# Patient Record
Sex: FEMALE | Race: WHITE | NOT HISPANIC OR LATINO | URBAN - METROPOLITAN AREA
[De-identification: names, ages, dates, MRNs, and addresses within clinical notes are randomized per-mention and may not be internally consistent; named-entity substitution may affect disease eponyms.]

---

## 2020-12-14 ENCOUNTER — MEDICAL CORRESPONDENCE (OUTPATIENT)
Dept: HEALTH INFORMATION MANAGEMENT | Facility: CLINIC | Age: 35
End: 2020-12-14

## 2020-12-14 ENCOUNTER — TRANSFERRED RECORDS (OUTPATIENT)
Dept: HEALTH INFORMATION MANAGEMENT | Facility: CLINIC | Age: 35
End: 2020-12-14

## 2020-12-15 ENCOUNTER — TRANSCRIBE ORDERS (OUTPATIENT)
Dept: OTHER | Age: 35
End: 2020-12-15

## 2020-12-15 DIAGNOSIS — M67.431 GANGLION CYST OF DORSUM OF BOTH WRISTS: Primary | ICD-10-CM

## 2020-12-15 DIAGNOSIS — M67.432 GANGLION CYST OF DORSUM OF BOTH WRISTS: Primary | ICD-10-CM

## 2020-12-17 NOTE — TELEPHONE ENCOUNTER
RECORDS RECEIVED FROM: Ganglion cyst of dorsum of both wrists/No imaging?/ Mirella James PA-C @ [facility]   DATE RECEIVED: Jan 18, 2021   NOTES STATUS DETAILS   OFFICE NOTE from referring provider Received    OFFICE NOTE from other specialist N/A    DISCHARGE SUMMARY from hospital N/A    DISCHARGE REPORT from the ER N/A    OPERATIVE REPORT N/A    MEDICATION LIST N/A    IMPLANT RECORD/STICKER N/A    LABS     CBC/DIFF N/A    CULTURES N/A    INJECTIONS DONE IN RADIOLOGY N/A    MRI N/A    CT SCAN N/A    XRAYS (IMAGES & REPORTS) N/A    TUMOR     PATHOLOGY  Slides & report N/A    12/17/20   12:52 PM   Complete  Ashley Souza CMA

## 2021-01-13 DIAGNOSIS — M67.431 GANGLION CYST OF BOTH WRISTS: Primary | ICD-10-CM

## 2021-01-13 DIAGNOSIS — M67.432 GANGLION CYST OF BOTH WRISTS: Primary | ICD-10-CM

## 2021-01-18 ENCOUNTER — OFFICE VISIT (OUTPATIENT)
Dept: ORTHOPEDICS | Facility: CLINIC | Age: 36
End: 2021-01-18
Payer: COMMERCIAL

## 2021-01-18 ENCOUNTER — ANCILLARY PROCEDURE (OUTPATIENT)
Dept: GENERAL RADIOLOGY | Facility: CLINIC | Age: 36
End: 2021-01-18
Attending: STUDENT IN AN ORGANIZED HEALTH CARE EDUCATION/TRAINING PROGRAM
Payer: COMMERCIAL

## 2021-01-18 ENCOUNTER — PRE VISIT (OUTPATIENT)
Dept: ORTHOPEDICS | Facility: CLINIC | Age: 36
End: 2021-01-18

## 2021-01-18 DIAGNOSIS — M67.432 GANGLION CYST OF DORSUM OF BOTH WRISTS: ICD-10-CM

## 2021-01-18 DIAGNOSIS — M67.431 GANGLION CYST OF DORSUM OF BOTH WRISTS: ICD-10-CM

## 2021-01-18 DIAGNOSIS — M67.432 GANGLION CYST OF BOTH WRISTS: ICD-10-CM

## 2021-01-18 DIAGNOSIS — M67.431 GANGLION CYST OF BOTH WRISTS: ICD-10-CM

## 2021-01-18 PROCEDURE — 73110 X-RAY EXAM OF WRIST: CPT | Mod: RT | Performed by: RADIOLOGY

## 2021-01-18 PROCEDURE — 99203 OFFICE O/P NEW LOW 30 MIN: CPT | Performed by: STUDENT IN AN ORGANIZED HEALTH CARE EDUCATION/TRAINING PROGRAM

## 2021-01-18 NOTE — PROGRESS NOTES
Hand Surgery History & Physical    REFERRING PHYSICIAN: Mirella James   PRIMARY CARE PHYSICIAN: No primary care provider on file.           Chief Complaint:   Consult (Ganglion cyst of dorsum of both hands )      History of Present Illness:  Symptom Profile Including: location of symptoms, onset, severity, exacerbating/alleviating factors, previous treatments:        Shayna Bradshaw is a 35 year old female who presents for evaluation of bilateral dorsal wrist masses as well as intermittent numbness and tingling in the bilateral thumb, index, middle fingers.    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  She states that she has had the right dorsal wrist mass for approximately 3 years.  It has not been that bothersome and and did rupture in the past after she fell and then recurred approximately 6 months later.  The left dorsal wrist mass presented as recently during finals and was associated with some pain.  She wears braces intermittently when it seems like they flareup.    Regarding the numbness and tingling in her bilateral thumb, index, middle fingers, she states that she first noticed this approximately 7 years ago after delivering her first child.  It resolved with bracing.  The symptoms occur intermittently and she perceives slight decrease sensation in the median nerve distribution.  She denies nocturnal numbness.  The symptoms do not wake her from sleep.  She has not done consistent bracing.             Past Medical History:   No past medical history on file.         Past Surgical History:   No past surgical history on file.    Oophorectomy for dermoid cyst         Social History:     Social History     Tobacco Use     Smoking status: Not on file   Substance Use Topics     Alcohol use: Not on file            Family History:   No family history on file.         Allergies:     Allergies   Allergen Reactions     Azithromycin GI  Disturbance            Medications:     No current outpatient medications on file.     No current facility-administered medications for this visit.              Review of Systems:     A 10 point ROS was performed and reviewed. Specific responses to these questions are noted at the end of the document.         Physical Exam:   Vitals: There were no vitals taken for this visit.    Physical Exam Adult:  General: Well-nourished, alert, cooperative with exam and in no acute distress  HEENT: Atraumatic, normocephalic, extraocular movements intact, moist mucous membranes, trachea midline  Pulmonary: Unlabored work of breathing, on room air  Cardiovascular: Warm and well-perfused extremities. 2+ radial pulses  Skin: Warm, intact without rashes to the upper extremities, head or neck   Gait: Normal  Neuro/psych: Oriented to time, place and person. Affect is appropriate    Musculoskeletal: A focused physical examination of the bilateral hands and wrists reveals:   Inspection- no evidence of deformity, malalignment ecchymosis or edema.  There is a 2 cm x 2 cm right dorsal wrist mass localized to the scapholunate joint.  On the left there is a 1 cm x 1 cm soft, mobile, dorsal wrist mass that is only visible with wrist hyperflexion.  Palpation-mild tenderness to palpation over the left scapholunate interval.  Nontender to palpation over the right or left dorsal wrist mass.  Neurovascular- intact light touch sensation and motor to distribution of the median, ulnar and radial nerves. Brisk capillary refill to the distal fingers. 2+ radial pulse.   Range of Motion: Full and symmetric. Able to make a full fist.  Stability: no dislocation, subluxation or laxity  Muscle strength and tone: No atrophy, spasticity or flaccidness. 5/5 strength EPL, FPL, APB, first dorsal interosseous.    Special Tests:    Nerve: two point discrimination millimeters in the median and ulnar nerve distributions bilaterally    Median nerve  Thenar atrophy:  None  Carpal tunnel compression test: Negative bilaterally  Phalen's test: Positive bilaterally  Tinel's test: Negative bilaterally    Ulnar nerve  First dorsal interosseous atrophy: Negative    Tendon: FDS and FDP intact to all fingers. Normal tenodesis effect.     Mera's test: Negative bilaterally                      Imaging:   3 view X-ray of the bilateral wrists obtained on 1/18/2021 was independently interpreted by me. The results were discussed with the patient.  Findings include:    Right wrist: Normal osseous anatomy with possible small cyst in the radial aspect of the lunate.  No scapholunate widening visualized.  Lateral reveals soft tissue mass.  No calcification within the mass.  No lytic or blastic lesions associated with the mass.  No effusion visualized.    Left Wrist: Normal osseous anatomy with possible small cyst in the radial aspect of the lunate.  No scapholunate widening visualized.  No soft tissue mass visualized on the lateral no calcification within the mass.  No lytic or blastic lesions associated with the mass.  No effusion visualized.             Assessment and Plan:   Assessment:  35 year old female with bilateral dorsal wrist masses consistent with ganglion cysts as well as signs, symptoms, clinical exam consistent with bilateral carpal tunnel syndrome.     At this time the carpal tunnel symptoms are more intermittent.  She does not have constant numbness and she has normal two-point discrimination and normal strength.     Plan:  1. I counseled the patient on her diagnoses and that the dorsal wrist masses are benign.  I counseled her that she has normal strength and normal sensation in the bilateral median nerve distribution.  2. For the carpal tunnel syndrome I recommend 6 weeks of consistent night splinting.  If her symptoms are refractory to this treatment we could explore options of corticosteroid injection and discuss surgery if this becomes blocked operative.  3. For the  ganglion cyst, I counseled her that it is up to her if she wants to manage them.  If they are bothersome to her either functionally or aesthetically displeasing we can perform surgical excision.  I would not really recommend aspiration due to the high risk of recurrence.  4. The patient would like to think about this option and will call us if she wishes to pursue surgical excision of the ganglion cyst.      Abi Dos Santos MD  Department of Orthopedic Surgery  Hand Surgery

## 2021-01-18 NOTE — NURSING NOTE
Reason For Visit:   Chief Complaint   Patient presents with     Consult     Ganglion cyst of dorsum of both hands        Primary MD: No primary care provider on file.  Ref. MD:  Mirella James     ?  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   Currently working? Yes.  Work status?  Part-time.  Date of injury: Gradual.   Date of Injury: No, surgery, but 3 fractures of R wrist. Hx of carpal tunnel after pregnancy.   No History of surgery.   Smoker: No    There were no vitals taken for this visit.      Pain Assessment  Patient Currently in Pain: Yes  0-10 Pain Scale: 1  Primary Pain Location: Hand  Other Pain Locations: really bad during final week previously      QuickDASH Assessment  No flowsheet data found.     Allergies   Allergen Reactions     Azithromycin GI Disturbance     Jayashree Chapman ATC

## 2021-01-18 NOTE — LETTER
1/18/2021         RE: Shayna Bradshaw  [address] [facility] [address]        Dear Colleague,    Thank you for referring your patient, Shayna Bradshaw, to the [facility]. Please see a copy of my visit note below.    Hand Surgery History & Physical    REFERRING PHYSICIAN: Mirella James   PRIMARY CARE PHYSICIAN: No primary care provider on file.           Chief Complaint:   Consult (Ganglion cyst of dorsum of both hands )      History of Present Illness:  Symptom Profile Including: location of symptoms, onset, severity, exacerbating/alleviating factors, previous treatments:        Shayna Bradshaw is a 35 year old female who presents for evaluation of bilateral dorsal wrist masses as well as intermittent numbness and tingling in the bilateral thumb, index, middle fingers.    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  She states that she has had the right dorsal wrist mass for approximately 3 years.  It has not been that bothersome and and did rupture in the past after she fell and then recurred approximately 6 months later.  The left dorsal wrist mass presented as recently during finals and was associated with some pain.  She wears braces intermittently when it seems like they flareup.    Regarding the numbness and tingling in her bilateral thumb, index, middle fingers, she states that she first noticed this approximately 7 years ago after delivering her first child.  It resolved with bracing.  The symptoms occur intermittently and she perceives slight decrease sensation in the median nerve distribution.  She denies nocturnal numbness.  The symptoms do not wake her from sleep.  She has not done consistent bracing.             Past Medical History:   No past medical history on file.         Past Surgical History:   No past surgical history on file.    Oophorectomy for dermoid cyst          Social History:     Social History     Tobacco Use     Smoking status: Not on file   Substance Use Topics     Alcohol use: Not on file            Family History:   No family history on file.         Allergies:     Allergies   Allergen Reactions     Azithromycin GI Disturbance            Medications:     No current outpatient medications on file.     No current facility-administered medications for this visit.              Review of Systems:     A 10 point ROS was performed and reviewed. Specific responses to these questions are noted at the end of the document.         Physical Exam:   Vitals: There were no vitals taken for this visit.    Physical Exam Adult:  General: Well-nourished, alert, cooperative with exam and in no acute distress  HEENT: Atraumatic, normocephalic, extraocular movements intact, moist mucous membranes, trachea midline  Pulmonary: Unlabored work of breathing, on room air  Cardiovascular: Warm and well-perfused extremities. 2+ radial pulses  Skin: Warm, intact without rashes to the upper extremities, head or neck   Gait: Normal  Neuro/psych: Oriented to time, place and person. Affect is appropriate    Musculoskeletal: A focused physical examination of the bilateral hands and wrists reveals:   Inspection- no evidence of deformity, malalignment ecchymosis or edema.  There is a 2 cm x 2 cm right dorsal wrist mass localized to the scapholunate joint.  On the left there is a 1 cm x 1 cm soft, mobile, dorsal wrist mass that is only visible with wrist hyperflexion.  Palpation-mild tenderness to palpation over the left scapholunate interval.  Nontender to palpation over the right or left dorsal wrist mass.  Neurovascular- intact light touch sensation and motor to distribution of the median, ulnar and radial nerves. Brisk capillary refill to the distal fingers. 2+ radial pulse.   Range of Motion: Full and symmetric. Able to make a full fist.  Stability: no dislocation, subluxation or laxity  Muscle  strength and tone: No atrophy, spasticity or flaccidness. 5/5 strength EPL, FPL, APB, first dorsal interosseous.    Special Tests:    Nerve: two point discrimination millimeters in the median and ulnar nerve distributions bilaterally    Median nerve  Thenar atrophy: None  Carpal tunnel compression test: Negative bilaterally  Phalen's test: Positive bilaterally  Tinel's test: Negative bilaterally    Ulnar nerve  First dorsal interosseous atrophy: Negative    Tendon: FDS and FDP intact to all fingers. Normal tenodesis effect.     Mera's test: Negative bilaterally                      Imaging:   3 view X-ray of the bilateral wrists obtained on 1/18/2021 was independently interpreted by me. The results were discussed with the patient.  Findings include:    Right wrist: Normal osseous anatomy with possible small cyst in the radial aspect of the lunate.  No scapholunate widening visualized.  Lateral reveals soft tissue mass.  No calcification within the mass.  No lytic or blastic lesions associated with the mass.  No effusion visualized.    Left Wrist: Normal osseous anatomy with possible small cyst in the radial aspect of the lunate.  No scapholunate widening visualized.  No soft tissue mass visualized on the lateral no calcification within the mass.  No lytic or blastic lesions associated with the mass.  No effusion visualized.             Assessment and Plan:   Assessment:  35 year old female with bilateral dorsal wrist masses consistent with ganglion cysts as well as signs, symptoms, clinical exam consistent with bilateral carpal tunnel syndrome.     At this time the carpal tunnel symptoms are more intermittent.  She does not have constant numbness and she has normal two-point discrimination and normal strength.     Plan:  1. I counseled the patient on her diagnoses and that the dorsal wrist masses are benign.  I counseled her that she has normal strength and normal sensation in the bilateral median nerve  distribution.  2. For the carpal tunnel syndrome I recommend 6 weeks of consistent night splinting.  If her symptoms are refractory to this treatment we could explore options of corticosteroid injection and discuss surgery if this becomes blocked operative.  3. For the ganglion cyst, I counseled her that it is up to her if she wants to manage them.  If they are bothersome to her either functionally or aesthetically displeasing we can perform surgical excision.  I would not really recommend aspiration due to the high risk of recurrence.  4. The patient would like to think about this option and will call us if she wishes to pursue surgical excision of the ganglion cyst.      Abi Dos Santos MD  Department of Orthopedic Surgery  Hand Surgery

## 2022-01-06 ENCOUNTER — OFFICE VISIT (OUTPATIENT)
Dept: OBGYN | Facility: CLINIC | Age: 37
End: 2022-01-06
Payer: COMMERCIAL

## 2022-01-06 VITALS
DIASTOLIC BLOOD PRESSURE: 78 MMHG | HEIGHT: 63 IN | BODY MASS INDEX: 31.54 KG/M2 | WEIGHT: 178 LBS | SYSTOLIC BLOOD PRESSURE: 110 MMHG

## 2022-01-06 DIAGNOSIS — Z13.0 SCREENING, ANEMIA, DEFICIENCY, IRON: ICD-10-CM

## 2022-01-06 DIAGNOSIS — Z13.220 SCREENING FOR LIPID DISORDERS: ICD-10-CM

## 2022-01-06 DIAGNOSIS — Z12.4 PAP SMEAR FOR CERVICAL CANCER SCREENING: ICD-10-CM

## 2022-01-06 DIAGNOSIS — Z13.29 SCREENING FOR THYROID DISORDER: ICD-10-CM

## 2022-01-06 DIAGNOSIS — Z01.419 ENCOUNTER FOR GYNECOLOGICAL EXAMINATION WITHOUT ABNORMAL FINDING: Primary | ICD-10-CM

## 2022-01-06 DIAGNOSIS — N39.498 OTHER URINARY INCONTINENCE: ICD-10-CM

## 2022-01-06 DIAGNOSIS — Z13.1 SCREENING FOR DIABETES MELLITUS: ICD-10-CM

## 2022-01-06 DIAGNOSIS — Z11.59 NEED FOR HEPATITIS C SCREENING TEST: ICD-10-CM

## 2022-01-06 DIAGNOSIS — Z23 NEED FOR TDAP VACCINATION: ICD-10-CM

## 2022-01-06 LAB
ALBUMIN SERPL-MCNC: 4.1 G/DL (ref 3.4–5)
ALP SERPL-CCNC: 81 U/L (ref 40–150)
ALT SERPL W P-5'-P-CCNC: 42 U/L (ref 0–50)
ANION GAP SERPL CALCULATED.3IONS-SCNC: 8 MMOL/L (ref 3–14)
AST SERPL W P-5'-P-CCNC: 19 U/L (ref 0–45)
BILIRUB SERPL-MCNC: 0.4 MG/DL (ref 0.2–1.3)
BUN SERPL-MCNC: 12 MG/DL (ref 7–30)
CALCIUM SERPL-MCNC: 8.8 MG/DL (ref 8.5–10.1)
CHLORIDE BLD-SCNC: 108 MMOL/L (ref 94–109)
CHOLEST SERPL-MCNC: 186 MG/DL
CO2 SERPL-SCNC: 22 MMOL/L (ref 20–32)
CREAT SERPL-MCNC: 0.72 MG/DL (ref 0.52–1.04)
ERYTHROCYTE [DISTWIDTH] IN BLOOD BY AUTOMATED COUNT: 12.1 % (ref 10–15)
FASTING STATUS PATIENT QL REPORTED: NO
GFR SERPL CREATININE-BSD FRML MDRD: >90 ML/MIN/1.73M2
GLUCOSE BLD-MCNC: 80 MG/DL (ref 70–99)
HBA1C MFR BLD: 5.1 % (ref 0–5.6)
HCT VFR BLD AUTO: 39.5 % (ref 35–47)
HCV AB SERPL QL IA: NONREACTIVE
HDLC SERPL-MCNC: 43 MG/DL
HGB BLD-MCNC: 13.2 G/DL (ref 11.7–15.7)
LDLC SERPL CALC-MCNC: 114 MG/DL
MCH RBC QN AUTO: 29.1 PG (ref 26.5–33)
MCHC RBC AUTO-ENTMCNC: 33.4 G/DL (ref 31.5–36.5)
MCV RBC AUTO: 87 FL (ref 78–100)
NONHDLC SERPL-MCNC: 143 MG/DL
PLATELET # BLD AUTO: 290 10E3/UL (ref 150–450)
POTASSIUM BLD-SCNC: 3.7 MMOL/L (ref 3.4–5.3)
PROT SERPL-MCNC: 7.6 G/DL (ref 6.8–8.8)
RBC # BLD AUTO: 4.54 10E6/UL (ref 3.8–5.2)
SODIUM SERPL-SCNC: 138 MMOL/L (ref 133–144)
TRIGL SERPL-MCNC: 145 MG/DL
TSH SERPL DL<=0.005 MIU/L-ACNC: 1.52 MU/L (ref 0.4–4)
WBC # BLD AUTO: 6.6 10E3/UL (ref 4–11)

## 2022-01-06 PROCEDURE — 90471 IMMUNIZATION ADMIN: CPT | Performed by: OBSTETRICS & GYNECOLOGY

## 2022-01-06 PROCEDURE — 87624 HPV HI-RISK TYP POOLED RSLT: CPT | Performed by: OBSTETRICS & GYNECOLOGY

## 2022-01-06 PROCEDURE — 85027 COMPLETE CBC AUTOMATED: CPT | Performed by: OBSTETRICS & GYNECOLOGY

## 2022-01-06 PROCEDURE — 83036 HEMOGLOBIN GLYCOSYLATED A1C: CPT | Performed by: OBSTETRICS & GYNECOLOGY

## 2022-01-06 PROCEDURE — 80061 LIPID PANEL: CPT | Performed by: OBSTETRICS & GYNECOLOGY

## 2022-01-06 PROCEDURE — G0145 SCR C/V CYTO,THINLAYER,RESCR: HCPCS | Performed by: OBSTETRICS & GYNECOLOGY

## 2022-01-06 PROCEDURE — 86803 HEPATITIS C AB TEST: CPT | Performed by: OBSTETRICS & GYNECOLOGY

## 2022-01-06 PROCEDURE — 36415 COLL VENOUS BLD VENIPUNCTURE: CPT | Performed by: OBSTETRICS & GYNECOLOGY

## 2022-01-06 PROCEDURE — 99385 PREV VISIT NEW AGE 18-39: CPT | Mod: 25 | Performed by: OBSTETRICS & GYNECOLOGY

## 2022-01-06 PROCEDURE — 84443 ASSAY THYROID STIM HORMONE: CPT | Performed by: OBSTETRICS & GYNECOLOGY

## 2022-01-06 PROCEDURE — 80053 COMPREHEN METABOLIC PANEL: CPT | Performed by: OBSTETRICS & GYNECOLOGY

## 2022-01-06 PROCEDURE — 90715 TDAP VACCINE 7 YRS/> IM: CPT | Performed by: OBSTETRICS & GYNECOLOGY

## 2022-01-06 RX ORDER — COPPER 313.4 MG/1
1 INTRAUTERINE DEVICE INTRAUTERINE ONCE
COMMUNITY

## 2022-01-06 ASSESSMENT — MIFFLIN-ST. JEOR: SCORE: 1461.53

## 2022-01-06 NOTE — LETTER
January 7, 2022      Shayna Bradshaw  [address]  [facility] [address]        Dear Ms.McNulty Bradshaw,    We are writing to inform you of your test results.  Your results are normal.    Resulted Orders   CBC with platelets   Result Value Ref Range    WBC Count 6.6 4.0 - 11.0 10e3/uL    RBC Count 4.54 3.80 - 5.20 10e6/uL    Hemoglobin 13.2 11.7 - 15.7 g/dL    Hematocrit 39.5 35.0 - 47.0 %    MCV 87 78 - 100 fL    MCH 29.1 26.5 - 33.0 pg    MCHC 33.4 31.5 - 36.5 g/dL    RDW 12.1 10.0 - 15.0 %    Platelet Count 290 150 - 450 10e3/uL   Comprehensive metabolic panel   Result Value Ref Range    Sodium 138 133 - 144 mmol/L    Potassium 3.7 3.4 - 5.3 mmol/L    Chloride 108 94 - 109 mmol/L    Carbon Dioxide (CO2) 22 20 - 32 mmol/L    Anion Gap 8 3 - 14 mmol/L    Urea Nitrogen 12 7 - 30 mg/dL    Creatinine 0.72 0.52 - 1.04 mg/dL    Calcium 8.8 8.5 - 10.1 mg/dL    Glucose 80 70 - 99 mg/dL    Alkaline Phosphatase 81 40 - 150 U/L    AST 19 0 - 45 U/L    ALT 42 0 - 50 U/L    Protein Total 7.6 6.8 - 8.8 g/dL    Albumin 4.1 3.4 - 5.0 g/dL    Bilirubin Total 0.4 0.2 - 1.3 mg/dL    GFR Estimate >90 >60 mL/min/1.73m2      Comment:      Effective December 21, 2021 eGFRcr in adults is calculated using the 2021 CKD-EPI creatinine equation which includes age and gender (Bhumi banks al., NEJM, DOI: 10.1056/CSYMat5637576)   Hemoglobin A1c   Result Value Ref Range    Hemoglobin A1C 5.1 0.0 - 5.6 %      Comment:      Normal <5.7%   Prediabetes 5.7-6.4%    Diabetes 6.5% or higher     Note: Adopted from ADA consensus guidelines.   TSH with free T4 reflex   Result Value Ref Range    TSH 1.52 0.40 - 4.00 mU/L   Lipid panel reflex to direct LDL Non-fasting   Result Value Ref Range    Cholesterol 186 <200 mg/dL    Triglycerides 145 <150 mg/dL    Direct Measure HDL 43 (L) >=50 mg/dL    LDL Cholesterol Calculated 114 (H) <=100 mg/dL    Non HDL Cholesterol 143 (H) <130 mg/dL    Patient Fasting > 8hrs? No     Narrative     Cholesterol  Desirable:  <200 mg/dL    Triglycerides  Normal:  Less than 150 mg/dL  Borderline High:  150-199 mg/dL  High:  200-499 mg/dL  Very High:  Greater than or equal to 500 mg/dL    Direct Measure HDL  Female:  Greater than or equal to 50 mg/dL   Male:  Greater than or equal to 40 mg/dL    LDL Cholesterol  Desirable:  <100mg/dL  Above Desirable:  100-129 mg/dL   Borderline High:  130-159 mg/dL   High:  160-189 mg/dL   Very High:  >= 190 mg/dL    Non HDL Cholesterol  Desirable:  130 mg/dL  Above Desirable:  130-159 mg/dL  Borderline High:  160-189 mg/dL  High:  190-219 mg/dL  Very High:  Greater than or equal to 220 mg/dL   Hepatitis C antibody   Result Value Ref Range    Hepatitis C Antibody Nonreactive Nonreactive    Narrative    Assay performance characteristics have not been established for newborns, infants, and children.       If you have any questions or concerns, please call the clinic at [phone].       Sincerely,      Roshni Martin MD

## 2022-01-06 NOTE — PROGRESS NOTES
Shayna is a 36 year old . female who presennormalts for annual exam.     Menses are regular q 26-27 days and  lasting 5 days.  Menses flow: normal.  Patient's last menstrual period was 2021.. Using IUD for contraception.  She is not currently considering pregnancy.  Besides routine health maintenance, she has no other health concerns today .  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 Has 2 kids, son 8.5 and daughter is almost 4 y/o. 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  Has long history of stress incontinence and has not been seen prior. Cannot do any running.  GYNECOLOGIC HISTORY:  Menarche:   Shayna is sexually active with 1male partner(s) and is currently in monogamous relationship.    History sexually transmitted infections:No STD history  STI testing offered?  Declined  ANNA exposure: Unknown  History of abnormal Pap smear: NO - age 30- 65 PAP every 3 years recommended  Family history of breast CA: No  Family history of uterine/ovarian CA: No    Family history of colon CA: No    HEALTH MAINTENANCE:  Cholesterol: (No results found for: CHOL History of abnormal lipids: No  Mammo: na . History of abnormal Mammo: No.  Regular Self Breast Exams: Yes  Calcium/Vitamin D intake: source:  dairy Adequate? No  TSH: (No results found for: TSH )  Pap; (No results found for: PAP )    HISTORY:  OB History    Para Term  AB Living   4 2 2 0 2 2   SAB IAB Ectopic Multiple Live Births   2 0 0 0 2      # Outcome Date GA Lbr Arturo/2nd Weight Sex Delivery Anes PTL Lv   4 Term 17 40w0d  3.572 kg (7 lb 14 oz) F    ROX      Name: Catia   3 Term 13 39w0d  3.572 kg (7 lb 14 oz) M    ROX      Name: Keith   2 SAB            1 SAB              Past Medical History:   Diagnosis Date     Known health problems: none      Past Surgical History:   Procedure Laterality Date     DENTAL SURGERY      wisdom teeth     LAPAROSCOPY Right 2014    right ovary removed for dermoid  "    Family History   Problem Relation Age of Onset     Skin Cancer Father         basal cell     Social History     Socioeconomic History     Marital status:      Spouse name: Not on file     Number of children: Not on file     Years of education: Not on file     Highest education level: Not on file   Occupational History     Not on file   Tobacco Use     Smoking status: Not on file     Smokeless tobacco: Not on file   Substance and Sexual Activity     Alcohol use: Not on file     Drug use: Not on file     Sexual activity: Not on file   Other Topics Concern     Not on file   Social History Narrative     Not on file     Social Determinants of Health     Financial Resource Strain: Not on file   Food Insecurity: Not on file   Transportation Needs: Not on file   Physical Activity: Not on file   Stress: Not on file   Social Connections: Not on file   Intimate Partner Violence: Not on file   Housing Stability: Not on file       Current Outpatient Medications:      paragard intrauterine copper device, 1 each by Intrauterine route once, Disp: , Rfl:      Allergies   Allergen Reactions     Azithromycin GI Disturbance       Past medical, surgical, social and family history were reviewed and updated in EPIC.    EXAM:  /78   Ht 1.6 m (5' 3\")   Wt 80.7 kg (178 lb)   LMP 12/30/2021   BMI 31.53 kg/m     BMI: Body mass index is 31.53 kg/m .  Constitutional: healthy, alert and no distress  Head: Normocephalic. No masses, lesions, tenderness or abnormalities  Neck: Neck supple. Trachea midline. No adenopathy. Thyroid symmetric, normal size.   Cardiovascular: RRR.   Respiratory: Negative.   Breast: Breasts reveal mild symmetric fibrocystic densities, but there are no dominant, discrete, fixed or suspicious masses found.  Gastrointestinal: Abdomen soft, non-tender, non-distended. No masses, organomegaly.  :  Vulva:  No external lesions, normal female hair distribution, no inguinal adenopathy.    Urethra:  Midline, " non-tender, well supported, no discharge  Vagina:  Moist, pink, no abnormal discharge, no lesions  Uterus:  Normal size, IUD strings at os , non-tender, freely mobile  Ovaries:  Right surgically absent, No masses appreciated, non-tender, mobile  Rectal Exam: deferred  Musculoskeletal: extremities normal  Skin: no suspicious lesions or rashes  Psychiatric: Affect appropriate, cooperative,mentation appears normal.     COUNSELING:   Reviewed preventive health counseling, as reflected in patient instructions       Contraception       Consider Hep C screening for all patients one time for ages 18-79 years   reports that she has never smoked. She has never used smokeless tobacco.    Body mass index is 31.53 kg/m .  Weight management plan: not really discussed  FRAX Risk Assessment    ASSESSMENT:  36 year old female with satisfactory annual exam  (Z01.419) Encounter for gynecological examination without abnormal finding  (primary encounter diagnosis)  Comment: paragard 3/17  Plan: given advanced care plan to fill out today    (Z23) Need for Tdap vaccination  Plan: TDAP VACCINE (Adacel, Boostrix)  [[id number]],         VACCINE ADMINISTRATION [facility], INITIAL    (Z12.4) Pap smear for cervical cancer screening  Plan: Pap imaged thin layer screen with HPV -         recommended age 30 - 65 years (select HPV order        below)        Discussed sending pap smear for HPV typing as well and that if both pap and HPV are negative, then can have q5 year pap smears.    (Z13.0) Screening, anemia, deficiency, iron  Plan: CBC with platelets        Check lab today    (Z13.220) Screening for lipid disorders  Comment: not fasting  Plan: Lipid panel reflex to direct LDL Non-fasting        Check lab today    (Z13.1) Screening for diabetes mellitus  Plan: Comprehensive metabolic panel, Hemoglobin A1c        Check lab today    (Z13.29) Screening for thyroid disorder  Plan: TSH with free T4 reflex        Check lab today    (Z11.59) Need for hepatitis  C screening test  Comment: per [facility]  Plan: Hepatitis C antibody        Check lab today    (N39.498) Other urinary incontinence   Comment: stress after  x2  Plan: Adult Urology Referral        Refer to urology for possible surgery.     Roshni Martin MD

## 2022-01-06 NOTE — NURSING NOTE
Clinic Administered Medication Documentation          Injectable Medication Documentation    Patient was given tdap. Prior to medication administration, verified patients identity using patient s name and date of birth. Please see MAR and medication order for additional information. Patient instructed to remain in clinic for 15 minutes.      Was entire vial of medication used? Yes  Vial/Syringe: Single dose vial  Expiration Date:  9/28/23  Was this medication supplied by the patient? No

## 2022-01-10 LAB
BKR LAB AP GYN ADEQUACY: NORMAL
BKR LAB AP GYN INTERPRETATION: NORMAL
BKR LAB AP HPV REFLEX: NORMAL
BKR LAB AP PREVIOUS ABNORMAL: NORMAL
PATH REPORT.COMMENTS IMP SPEC: NORMAL
PATH REPORT.COMMENTS IMP SPEC: NORMAL
PATH REPORT.RELEVANT HX SPEC: NORMAL

## 2022-01-12 LAB
HUMAN PAPILLOMA VIRUS 16 DNA: NEGATIVE
HUMAN PAPILLOMA VIRUS 18 DNA: NEGATIVE
HUMAN PAPILLOMA VIRUS FINAL DIAGNOSIS: NORMAL
HUMAN PAPILLOMA VIRUS OTHER HR: NEGATIVE

## 2022-01-13 ENCOUNTER — PRE VISIT (OUTPATIENT)
Dept: UROLOGY | Facility: CLINIC | Age: 37
End: 2022-01-13
Payer: COMMERCIAL

## 2022-01-13 NOTE — TELEPHONE ENCOUNTER
MEDICAL RECORDS REQUEST   [facility]  Urology Clinic  [address] [place] [address]  PHONE: [phone]  Fax: [phone]        FUTURE VISIT INFORMATION                                                   Shayna Bradshaw, : 1985 scheduled for future visit at [facility]    APPOINTMENT INFORMATION:    Date: 03/10/2022    Provider:  Yesenia Stevens MD    Reason for Visit/Diagnosis: incontinence    REFERRAL INFORMATION:    Referring provider:  Roshni Martin MD    Specialty: N/A    Referring providers clinic:   OBGYN    Clinic contact number:  N/A    RECORDS REQUESTED FOR VISIT                                                     NOTES  STATUS/DETAILS   OFFICE NOTE from referring provider  yes, 2022 -- Roshni Martin MD   OFFICE NOTE from other specialist  no   DISCHARGE SUMMARY from hospital  no   DISCHARGE REPORT from the ER  no   OPERATIVE REPORT  no   MEDICATION LIST  yes   LABS     URINALYSIS (UA)  yes   URINE CYTOLOGY  no     PRE-VISIT CHECKLIST      Record collection complete Yes   Appointment appropriately scheduled           (right time/right provider) Yes   Joint diagnostic appointment coordinated correctly          (ensure right order & amount of time) Yes   MyChart activation If no, please explain pending   Questionnaire complete If no, please explain pending

## 2022-03-02 ENCOUNTER — PRE VISIT (OUTPATIENT)
Dept: UROLOGY | Facility: CLINIC | Age: 37
End: 2022-03-02
Payer: COMMERCIAL

## 2022-03-02 NOTE — TELEPHONE ENCOUNTER
Reason for visit: incontinence consult     Relevant information: IUD, stress incontinence    Records/imaging/labs/orders: all records available    Pt called: no need for a call    At Rooming: collect a urine/pvr

## 2022-06-09 ENCOUNTER — E-VISIT (OUTPATIENT)
Dept: URGENT CARE | Facility: CLINIC | Age: 37
End: 2022-06-09
Payer: COMMERCIAL

## 2022-06-09 DIAGNOSIS — J01.90 ACUTE BACTERIAL SINUSITIS: Primary | ICD-10-CM

## 2022-06-09 DIAGNOSIS — B96.89 ACUTE BACTERIAL SINUSITIS: Primary | ICD-10-CM

## 2022-06-09 PROCEDURE — 99421 OL DIG E/M SVC 5-10 MIN: CPT | Performed by: NURSE PRACTITIONER

## 2022-06-10 NOTE — PATIENT INSTRUCTIONS
Sinusitis (Antibiotic Treatment)    The sinuses are air-filled spaces within the bones of the face. They connect to the inside of the nose. Sinusitis is an inflammation of the tissue that lines the sinuses. Sinusitis can occur during a cold. It can also happen due to allergies to pollens and other particles in the air. Sinusitis can cause symptoms of sinus congestion and a feeling of fullness. A sinus infection causes fever, headache, and facial pain. There is often green or yellow fluid draining from the nose or into the back of the throat (post-nasal drip). You have been given antibiotics to treat this condition.   Home care    Take the full course of antibiotics as instructed. Don't stop taking them, even when you feel better.    Drink plenty of water, hot tea, and other liquids as directed by the healthcare provider. This may help thin nasal mucus. It also may help your sinuses drain fluids.    Heat may help soothe painful areas of your face. Use a towel soaked in hot water. Or,  the shower and direct the warm spray onto your face. Using a vaporizer along with a menthol rub at night may also help soothe symptoms.     An expectorant with guaifenesin may help thin nasal mucus and help your sinuses drain fluids. Talk with your provider or pharmacists before taking an over-the-counter (OTC) medicine if you have any questions about it or its side effects..    You can use an OTC decongestant, unless a similar medicine was prescribed to you. Nasal sprays work the fastest. Use one that contains phenylephrine or oxymetazoline. First blow your nose gently. Then use the spray. Don't use these medicines more often than directed on the label. If you do, your symptoms may get worse. You may also take pills that contain pseudoephedrine. Don t use products that combine multiple medicines. This is because side effects may be increased. Read labels. You can also ask the pharmacist for help. (People with high blood  pressure should not use decongestants. They can raise blood pressure.) Talk with your provider or pharmacist if you have any questions about the medicine..    OTC antihistamines may help if allergies contributed to your sinusitis. Talk with your provider or pharmacist if you have any questions about the medicine..    Don't use nasal rinses or irrigation during an acute sinus infection, unless your healthcare provider tells you to. Rinsing may spread the infection to other areas in your sinuses.    Use acetaminophen or ibuprofen to control pain, unless another pain medicine was prescribed to you. If you have chronic liver or kidney disease or ever had a stomach ulcer, talk with your healthcare provider before using these medicines. Never give aspirin to anyone under age 18 who is ill with a fever. It may cause severe liver damage.    Don't smoke. This can make symptoms worse.    Follow-up care  Follow up with your healthcare provider, or as advised.   When to seek medical advice  Call your healthcare provider if any of these occur:     Facial pain or headache that gets worse    Stiff neck    Unusual drowsiness or confusion    Swelling of your forehead or eyelids    Symptoms don't go away in 10 days    Vision problems, such as blurred or double vision    Fever of 100.4 F (38 C) or higher, or as directed by your healthcare provider  Call 911  Call 911 if any of these occur:     Seizure    Trouble breathing    Feeling dizzy or faint    Fingernails, skin or lips look blue, purple , or gray  Prevention  Here are steps you can take to help prevent an infection:     Keep good hand washing habits.    Don t have close contact with people who have sore throats, colds, or other upper respiratory infections.    Don t smoke, and stay away from secondhand smoke.    Stay up to date with of your vaccines.  [employer] last reviewed this educational content on 12/1/2019 2000-2021 [employer]. All rights reserved. This  information is not intended as a substitute for professional medical care. Always follow your healthcare professional's instructions.        Dear Shayna Bradshaw    After reviewing your responses, I've been able to diagnose you with?a sinus infection caused by bacteria.?     Based on your responses and diagnosis, I have prescribed augmentin to treat your symptoms. I have sent this to your pharmacy.?     It is also important to stay well hydrated, get lots of rest and take over-the-counter decongestants,?tylenol?or ibuprofen if you?are able to?take those medications per your primary care provider to help relieve discomfort.?     It is important that you take?all of?your prescribed medication even if your symptoms are improving after a few doses.? Taking?all of?your medicine helps prevent the symptoms from returning.?     If your symptoms worsen, you develop severe headache, vomiting, high fever (>102), or are not improving in 7 days, please contact your primary care provider for an appointment or visit any of our convenient Walk-in Care or Urgent Care Centers to be seen which can be found on our website?here.?     Thanks again for choosing?us?as your health care partner,?   ?  Rolanda Navarro, CNP?

## 2022-09-24 ENCOUNTER — HEALTH MAINTENANCE LETTER (OUTPATIENT)
Age: 37
End: 2022-09-24

## 2023-05-08 ENCOUNTER — HEALTH MAINTENANCE LETTER (OUTPATIENT)
Age: 38
End: 2023-05-08

## 2024-05-11 ENCOUNTER — HEALTH MAINTENANCE LETTER (OUTPATIENT)
Age: 39
End: 2024-05-11

## 2024-05-16 ENCOUNTER — ANCILLARY PROCEDURE (OUTPATIENT)
Dept: MAMMOGRAPHY | Facility: CLINIC | Age: 39
End: 2024-05-16
Attending: FAMILY MEDICINE
Payer: COMMERCIAL

## 2024-05-16 DIAGNOSIS — N64.59 BREAST THICKENING: ICD-10-CM

## 2024-05-16 DIAGNOSIS — N64.4 BREAST TENDERNESS IN FEMALE: ICD-10-CM

## 2024-05-16 PROCEDURE — 76642 ULTRASOUND BREAST LIMITED: CPT | Mod: LT | Performed by: STUDENT IN AN ORGANIZED HEALTH CARE EDUCATION/TRAINING PROGRAM

## 2024-05-16 PROCEDURE — 77066 DX MAMMO INCL CAD BI: CPT | Performed by: STUDENT IN AN ORGANIZED HEALTH CARE EDUCATION/TRAINING PROGRAM

## 2024-05-16 PROCEDURE — G0279 TOMOSYNTHESIS, MAMMO: HCPCS | Performed by: STUDENT IN AN ORGANIZED HEALTH CARE EDUCATION/TRAINING PROGRAM

## 2024-11-22 ENCOUNTER — MEDICAL CORRESPONDENCE (OUTPATIENT)
Dept: HEALTH INFORMATION MANAGEMENT | Facility: CLINIC | Age: 39
End: 2024-11-22
Payer: COMMERCIAL

## 2024-12-29 NOTE — PROGRESS NOTES
AUDIOLOGY REPORT    SUBJECTIVE:  Shayna Bradshaw is a 39 year old female who was seen on 1/8/25 in the Audiology Clinic at the [facility] for audiologic evaluation, referred by Latosha Ramsey PA-C.      Shayna had COVID in December 2023. Since that time, she has had intermittent plugging in both ears and was told that she has had bulging tympanic membranes and middle ear fluid bilaterally. She started to use her allergy medication full time in November 2024 but her ear concerns continue so her PCP referred her to Audiology and ENT.  She has not yet scheduled the ENT visit.  Shayna notes concerns about hearing in both ears (right worse than left).  She denies tinnitus, ear pain, drainage from ears, dizziness, history of ear surgery, or history of noise exposure.      OBJECTIVE:  Abuse Screening:  Do you feel unsafe at home or work/school? No  Do you feel threatened by someone? No  Does anyone try to keep you from having contact with others, or doing things outside of your home? No  Physical signs of abuse present? No     Fall Risk Screen:  1. Have you fallen two or more times in the past year? No  2. Have you fallen and had an injury in the past year? No    Timed Up and Go Score (in seconds): not tested  Is patient a fall risk? No  Referral initiated: No  Fall Risk Assessment Completed by Audiology    Otoscopic exam indicates ears are clear of cerumen bilaterally.      Pure Tone Thresholds assessed using conventional audiometry with good reliability from 250-8000 Hz bilaterally using insert earphones and circumaural headphones.     RIGHT:  Normal hearing    LEFT:  Normal hearing     Tympanogram:    RIGHT: normal eardrum mobility    LEFT:   normal eardrum mobility    Reflexes (reported by stimulus ear):  RIGHT: Ipsilateral is present at normal levels  RIGHT: Contralateral is present at normal levels  LEFT:   Ipsilateral is present at normal levels  LEFT:   Contralateral  is present at normal levels    Speech Reception Threshold:    RIGHT: 10 dB HL    LEFT:   10 dB HL  Word Recognition Score:     RIGHT: 100% at 50 dB HL using NU-6 recorded word list.    LEFT:   100% at 50 dB HL using NU-6 recorded word list.    ASSESSMENT:   Normal hearing bilaterally  Normal middle ear function bilaterally  Intermittent plugging of both ears    Today s results were discussed with the patient in detail.     PLAN:  Patient understands that today's test results were normal but she would still like to proceed with the ENT consult, as recommended by her PCP.  ENT consult will be scheduled at checkout today.     The patient expressed understanding and agreement with this plan.    Marti López, CCC-A, [facility]  Licensed Audiologist  [place] #[id number]       Cc Latosha Ramsey PA-C  [place] [phone]

## 2025-01-08 ENCOUNTER — OFFICE VISIT (OUTPATIENT)
Dept: AUDIOLOGY | Facility: CLINIC | Age: 40
End: 2025-01-08
Payer: COMMERCIAL

## 2025-01-08 DIAGNOSIS — H93.8X3 PLUGGED FEELING IN EAR, BILATERAL: Primary | ICD-10-CM

## 2025-02-17 ENCOUNTER — OFFICE VISIT (OUTPATIENT)
Dept: OTOLARYNGOLOGY | Facility: CLINIC | Age: 40
End: 2025-02-17
Payer: COMMERCIAL

## 2025-02-17 VITALS — BODY MASS INDEX: 31.89 KG/M2 | HEIGHT: 63 IN | WEIGHT: 180 LBS

## 2025-02-17 DIAGNOSIS — H93.8X3 SENSATION OF FULLNESS IN BOTH EARS: Primary | ICD-10-CM

## 2025-02-17 PROCEDURE — 99203 OFFICE O/P NEW LOW 30 MIN: CPT | Performed by: REGISTERED NURSE

## 2025-02-17 NOTE — PATIENT INSTRUCTIONS
For TMJ relief self-care treatments can be effective and include:  48-72 hours of IBUPROFEN/ADVIL 400-600 mg ( 2-3 200 mg pills) every 8 hours x 3 days. Take with food to avoid stomach upset.   Gentle massage of the muscle above your ear is recommended also.   Warm packs.   SOFT DIET is recommended.  Keeping your teeth apart when you are not swallowing or eating : potentially placing tongue to front of mouth, or between teeth to prevent grinding and rest the muscles of your jaw.  Practicing good posture     -If symptoms do not improve with management strategies discussed above, recommend following up with dentist for discussion of further management. Contact clinic if you would like a TMJ Clinic Referral.

## 2025-02-17 NOTE — PROGRESS NOTES
Otolaryngology Clinic  February 17, 2025    Chief Complaint:   Bilateral ear fullness       History of Present Illness:   Shayna Bradshaw is a 39 year old female who presents today for evaluation of bilateral ear fullness.  Patient reports that they had COVID in December 2023.  Since that time, patient has had intermittent plugging and fullness in both ears.  Patient has been seen in urgent care and was told that they have bulging TMs and fluid in the middle ear space.  Patient started allergy medications without improvement.  Patient does feel that the ear fullness may impact her ability to hear.  Patient denies any tinnitus, otalgia, otorrhea, dizziness, history of ear surgeries or frequent ear infections.  Patient denies history of noise trauma or family history of hearing loss.  When asked about possible grinding/clenching, they do feel that they hold a lot of tension in the jaw and neck.    Past Medical History:  Past Medical History:   Diagnosis Date    Known health problems: none        Past Surgical History:  Past Surgical History:   Procedure Laterality Date    DENTAL SURGERY      wisdom teeth    LAPAROSCOPY Right 01/2014    right ovary removed for dermoid       Medications:  Current Outpatient Medications   Medication Sig Dispense Refill    paragard intrauterine copper device 1 each by Intrauterine route once         Allergies:  Allergies   Allergen Reactions    Azithromycin GI Disturbance        Social History:  Social History     Tobacco Use    Smoking status: Never    Smokeless tobacco: Never   Substance Use Topics    Alcohol use: Yes    Drug use: Never       ROS: 10 point ROS neg other than the symptoms noted above in the HPI.    Physical Exam:    There were no vitals taken for this visit.     Constitutional:  The patient was unaccompanied, well-groomed, and in no acute distress.     Skin: Normal:  warm and pink without rash    Neurologic: Alert and oriented x 3.  CN's III-XII within normal  limits.  Voice normal.    Psychiatric: The patient's affect was calm, cooperative, and appropriate.     Communication:  Normal; communicates verbally, normal voice quality.    Respiratory: Breathing comfortably without stridor or exertion of accessory muscles.    Head/Face:  Normocephalic and atraumatic.  No lesions or scars.   Salivary glands -  Normal size, no tenderness, swelling, or palpable masses    Ears: Pinnae and tragus non-tender.  EAC's and TM's were clear.     Oral Cavity: Tenderness with palpation of temporomandibular joints.         Audiogram: 1/8/2025 - data independently reviewed  Normal hearing bilaterally   % at 50 dB bilaterally  Acoustic Reflexes: Present in all conditions  Tympanograms: type A bilaterally    Assessment and Plan:  1. Sensation of fullness in both ears (Primary)  Patient with bilateral ear fullness. There is no evidence of effusion, retraction or bulging of the TMs of bilateral ears on today's exam. Audiogram and tympanogram are normal. Ear exam is healthy. There is tenderness with palpation of the temporomandibular joints on exam today. I suspect these symptoms are due to inflammation of the temporomandibular joint and neck muscle tension. Discussed management of TMJ including NSAIDS, warm compresses, awareness of clenching and relaxation techniques, and soft diet.    Recommend that patient practice these management techniques.  If symptoms do not improve with this recommended management, patient should follow-up with dentist for further evaluation.  Patient can also contact clinic for a TMJ clinic referral.    Patient will follow up as needed in ENT Clinic.    Melany Varela DNP, APRN, CNP  Otolaryngology  Head & Neck Surgery  [phone]    30 minutes spent by me on the date of the encounter doing chart review, history and exam, documentation and further activities per the note

## 2025-02-17 NOTE — LETTER
2/17/2025       RE: Shayna Bradshaw  [address]  [facility] [address]     Dear Colleague,    Thank you for referring your patient, Shayna Bradshaw, to the [facility] [place] at [facility]. Please see a copy of my visit note below.      Otolaryngology Clinic  February 17, 2025    Chief Complaint:   Bilateral ear fullness       History of Present Illness:   Shayna Bradshaw is a 39 year old female who presents today for evaluation of bilateral ear fullness.  Patient reports that they had COVID in December 2023.  Since that time, patient has had intermittent plugging and fullness in both ears.  Patient has been seen in urgent care and was told that they have bulging TMs and fluid in the middle ear space.  Patient started allergy medications without improvement.  Patient does feel that the ear fullness may impact her ability to hear.  Patient denies any tinnitus, otalgia, otorrhea, dizziness, history of ear surgeries or frequent ear infections.  Patient denies history of noise trauma or family history of hearing loss.  When asked about possible grinding/clenching, they do feel that they hold a lot of tension in the jaw and neck.    Past Medical History:  Past Medical History:   Diagnosis Date     Known health problems: none        Past Surgical History:  Past Surgical History:   Procedure Laterality Date     DENTAL SURGERY      wisdom teeth     LAPAROSCOPY Right 01/2014    right ovary removed for dermoid       Medications:  Current Outpatient Medications   Medication Sig Dispense Refill     paragard intrauterine copper device 1 each by Intrauterine route once         Allergies:  Allergies   Allergen Reactions     Azithromycin GI Disturbance        Social History:  Social History     Tobacco Use     Smoking status: Never     Smokeless tobacco: Never   Substance Use Topics     Alcohol use: Yes     Drug use: Never        ROS: 10 point ROS neg other than the symptoms noted above in the HPI.    Physical Exam:    There were no vitals taken for this visit.     Constitutional:  The patient was unaccompanied, well-groomed, and in no acute distress.     Skin: Normal:  warm and pink without rash    Neurologic: Alert and oriented x 3.  CN's III-XII within normal limits.  Voice normal.    Psychiatric: The patient's affect was calm, cooperative, and appropriate.     Communication:  Normal; communicates verbally, normal voice quality.    Respiratory: Breathing comfortably without stridor or exertion of accessory muscles.    Head/Face:  Normocephalic and atraumatic.  No lesions or scars.   Salivary glands -  Normal size, no tenderness, swelling, or palpable masses    Ears: Pinnae and tragus non-tender.  EAC's and TM's were clear.     Oral Cavity: Tenderness with palpation of temporomandibular joints.         Audiogram: 1/8/2025 - data independently reviewed  Normal hearing bilaterally   % at 50 dB bilaterally  Acoustic Reflexes: Present in all conditions  Tympanograms: type A bilaterally    Assessment and Plan:  1. Sensation of fullness in both ears (Primary)  Patient with bilateral ear fullness. There is no evidence of effusion, retraction or bulging of the TMs of bilateral ears on today's exam. Audiogram and tympanogram are normal. Ear exam is healthy. There is tenderness with palpation of the temporomandibular joints on exam today. I suspect these symptoms are due to inflammation of the temporomandibular joint and neck muscle tension. Discussed management of TMJ including NSAIDS, warm compresses, awareness of clenching and relaxation techniques, and soft diet.    Recommend that patient practice these management techniques.  If symptoms do not improve with this recommended management, patient should follow-up with dentist for further evaluation.  Patient can also contact clinic for a TMJ clinic referral.    Patient will follow up  as needed in ENT Clinic.    Melany Varela DNP, APRN, CNP  Otolaryngology  Head & Neck Surgery  [phone]    30 minutes spent by me on the date of the encounter doing chart review, history and exam, documentation and further activities per the note      Again, thank you for allowing me to participate in the care of your patient.      Sincerely,    Rosa Varela, NP

## 2025-04-27 ENCOUNTER — OFFICE VISIT (OUTPATIENT)
Dept: URGENT CARE | Facility: URGENT CARE | Age: 40
End: 2025-04-27
Payer: COMMERCIAL

## 2025-04-27 VITALS
HEART RATE: 63 BPM | RESPIRATION RATE: 16 BRPM | SYSTOLIC BLOOD PRESSURE: 110 MMHG | DIASTOLIC BLOOD PRESSURE: 82 MMHG | BODY MASS INDEX: 33.31 KG/M2 | HEIGHT: 63 IN | WEIGHT: 188 LBS | OXYGEN SATURATION: 97 % | TEMPERATURE: 97.9 F

## 2025-04-27 DIAGNOSIS — J22 LOWER RESP. TRACT INFECTION: Primary | ICD-10-CM

## 2025-04-27 PROCEDURE — 3079F DIAST BP 80-89 MM HG: CPT | Performed by: PHYSICIAN ASSISTANT

## 2025-04-27 PROCEDURE — 99203 OFFICE O/P NEW LOW 30 MIN: CPT | Performed by: PHYSICIAN ASSISTANT

## 2025-04-27 PROCEDURE — 3074F SYST BP LT 130 MM HG: CPT | Performed by: PHYSICIAN ASSISTANT

## 2025-04-27 RX ORDER — ALBUTEROL SULFATE 90 UG/1
2-4 INHALANT RESPIRATORY (INHALATION) EVERY 4 HOURS PRN
Qty: 18 G | Refills: 0 | Status: SHIPPED | OUTPATIENT
Start: 2025-04-27

## 2025-04-27 NOTE — PROGRESS NOTES
SUBJECTIVE:  Shayna Bradshaw is a 40 year old female comes in with a 1 week history of URI related symptoms.  Initially started as a hoarse voice and cold-like symptoms thought that it was potentially allergies or just a regular cold this was around a sick nephew.  Seems to be moving now more into the chest.  Denies any shortness of breath or chest pain but does feel congested and has had some bronchospasm.  Used an inhaler of albuterol in the past for this.  Does not have any underlying asthma diagnosis.  Denies any significant sore throat, headache, ear pain, GI symptoms or rashes.  Has been using over-the-counter meds for symptomatic relief.  Is otherwise at baseline health.  no fevers have been noted    Past Medical History:   Diagnosis Date    Known health problems: none      There is no problem list on file for this patient.    Current Outpatient Medications   Medication Sig Dispense Refill    paragard intrauterine copper device 1 each by Intrauterine route once       No current facility-administered medications for this visit.     Social History     Socioeconomic History    Marital status:      Spouse name: Not on file    Number of children: Not on file    Years of education: Not on file    Highest education level: Not on file   Occupational History    Not on file   Tobacco Use    Smoking status: Never    Smokeless tobacco: Never   Substance and Sexual Activity    Alcohol use: Yes    Drug use: Never    Sexual activity: Yes     Partners: Male     Birth control/protection: I.U.D.   Other Topics Concern    Not on file   Social History Narrative    Not on file     Social Drivers of Health     Financial Resource Strain: Not on File (11/5/2024)    Received from [facility]    Financial Resource Strain     Financial Resource Strain: 0   Food Insecurity: Not on File (11/5/2024)    Received from [facility]    Food Insecurity     Food: 0   Transportation Needs: Not on File (11/5/2024)    Received from [facility]     Transportation Needs     Transportation: 0   Physical Activity: Not on File (2024)    Received from [facility]    Physical Activity     Physical Activity: 0   Stress: Not on File (2024)    Received from [facility]    Stress     Stress: 0   Social Connections: Not on File (2024)    Received from [facility]    Social Connections     Connectedness: 0   Interpersonal Safety: Not on file   Housing Stability: Not on File (2024)    Received from [facility]    Housing Stability     Housin     ROS  negative other than stated above    Exam:  GENERAL APPEARANCE: healthy, alert and no distress  EYES: EOMI,  PERRL  HENT: ear canals and TM's normal and nose and mouth without ulcers or lesions  NECK: no adenopathy, no asymmetry, masses, or scars and thyroid normal to palpation  RESP: Congested sounding airway anterior aspect of the chest.  Does have some scattered rhonchi  CV: regular rates and rhythm, normal S1 S2, no S3 or S4 and no murmur, click or rub -  SKIN: no suspicious lesions or rashes    assessment/plan:  (J22) Lower resp. tract infection  (primary encounter diagnosis)  Comment:   Plan: amoxicillin-clavulanate (AUGMENTIN) 875-125 MG         tablet, albuterol (PROAIR HFA/PROVENTIL         HFA/VENTOLIN HFA) 108 (90 Base) MCG/ACT inhaler        Patient with 1 week history of URI related symptoms.  Most likely viral at this time.  Will use albuterol as needed along with over-the-counter meds for symptomatic relief.  May give trial of Augmentin if symptoms fail to improve.  Follow-up with primary if symptoms worsen or new symptoms develop.

## 2025-04-27 NOTE — PROGRESS NOTES
Urgent Care Clinic Visit    Chief Complaint   Patient presents with    Urgent Care     Started Monday and Tuesday, got better and then Thursday upper respiratory                4/27/2025     9:11 AM   Additional Questions   Roomed by Kiana Huffman   Accompanied by self         4/27/2025     9:11 AM   Patient Reported Additional Medications   Patient reports taking the following new medications XYZAL (levocetirizine) 5 MG Tabs New  Add as: levocetirizine (XYZAL) 5 MG tablet,fluticasone (FLONASE ALLERGY RELIEF) 50 mcg/actuation nasal spray, muxinex DM

## 2025-05-17 ENCOUNTER — HEALTH MAINTENANCE LETTER (OUTPATIENT)
Age: 40
End: 2025-05-17

## 2025-06-09 ENCOUNTER — TELEPHONE (OUTPATIENT)
Dept: OTOLARYNGOLOGY | Facility: CLINIC | Age: 40
End: 2025-06-09
Payer: COMMERCIAL

## 2025-06-09 NOTE — TELEPHONE ENCOUNTER
[facility] Call Center    Phone Message    May a detailed message be left on voicemail: yes     Reason for Call: Other: [facility] calling in regards to needing a PA completed. Please call Laurent at [phone]. Thanks     Action Taken: Other: ENT    Travel Screening: Not Applicable     Date of Service:

## 2025-06-09 NOTE — TELEPHONE ENCOUNTER
Returned call to Laurent to review information. Provided information regarding office visit with Melany Mancilla will update clinic if he needs further information.    Karma Brand BSN, RN